# Patient Record
(demographics unavailable — no encounter records)

---

## 2025-06-26 NOTE — ASSESSMENT
[FreeTextEntry1] : shortness of breath  pulse ox 98-96 with walking up and down hallway. no tachycardia Pt symptomatic during the walk EKG performed on this day in the office  to evaluate for any ischemia, new arrhythmia, and for any significant changes and reviewed by KISHAN Breaux. It is stable. cxr ct  angio chest  pe protocol  Labs drawn/ specimens obtained  in office on this date of service  for evaluation of   assessed conditions -cbc cmp iron tft      to be run at [facility]. trail albuterol hfa 2 puff qid  trial Zoloft 50 mg 1/2 tab qhd then full tab 50 mg qd Follow up 1 week Activity as tolerated Go to ER if worse chest pain or shortness of breath.

## 2025-06-26 NOTE — REVIEW OF SYSTEMS
[Recent Change In Weight] : ~T recent weight change [Shortness Of Breath] : shortness of breath [Wheezing] : no wheezing [Cough] : no cough [Negative] : Gastrointestinal

## 2025-06-26 NOTE — HISTORY OF PRESENT ILLNESS
[FreeTextEntry1] : patient presents b/c she feels shortness of breath  feels winded  going up the stairs is difficult  doesn't exercise much  wants to discuss anxiety first noticed 2 months ago  before the heat wave and all   declines chest pain  just out of breath can't talk    [de-identified] : 46 yo wf . co shortness of breath she notices it when she walks to the car or exercises.  she has a lot of anxiety she is still going through the divorce she is anxious. she gained a lot of weight on the previous meds she got off them. she needs something now to take the edge off.  she denies cp or calf pain or long car rides hx of anemia